# Patient Record
Sex: MALE | Race: WHITE | NOT HISPANIC OR LATINO
[De-identification: names, ages, dates, MRNs, and addresses within clinical notes are randomized per-mention and may not be internally consistent; named-entity substitution may affect disease eponyms.]

---

## 2018-01-09 ENCOUNTER — APPOINTMENT (OUTPATIENT)
Dept: UROLOGY | Facility: CLINIC | Age: 48
End: 2018-01-09
Payer: COMMERCIAL

## 2018-01-09 DIAGNOSIS — Z98.890 OTHER SPECIFIED POSTPROCEDURAL STATES: ICD-10-CM

## 2018-01-09 DIAGNOSIS — Z90.81 OTHER SPECIFIED POSTPROCEDURAL STATES: ICD-10-CM

## 2018-01-09 DIAGNOSIS — Z80.6 FAMILY HISTORY OF LEUKEMIA: ICD-10-CM

## 2018-01-09 DIAGNOSIS — R39.15 URGENCY OF URINATION: ICD-10-CM

## 2018-01-09 DIAGNOSIS — Z00.00 ENCOUNTER FOR GENERAL ADULT MEDICAL EXAMINATION W/OUT ABNORMAL FINDINGS: ICD-10-CM

## 2018-01-09 DIAGNOSIS — Z78.9 OTHER SPECIFIED HEALTH STATUS: ICD-10-CM

## 2018-01-09 PROCEDURE — 51798 US URINE CAPACITY MEASURE: CPT

## 2018-01-09 PROCEDURE — 99244 OFF/OP CNSLTJ NEW/EST MOD 40: CPT

## 2018-01-09 RX ORDER — ALFUZOSIN HYDROCHLORIDE 10 MG/1
10 TABLET, EXTENDED RELEASE ORAL
Qty: 90 | Refills: 3 | Status: ACTIVE | COMMUNITY
Start: 2018-01-09 | End: 1900-01-01

## 2018-01-10 LAB
APPEARANCE: CLEAR
BACTERIA: NEGATIVE
BILIRUBIN URINE: NEGATIVE
BLOOD URINE: NEGATIVE
COLOR: YELLOW
GLUCOSE QUALITATIVE U: NEGATIVE MG/DL
KETONES URINE: NEGATIVE
LEUKOCYTE ESTERASE URINE: NEGATIVE
MICROSCOPIC-UA: NORMAL
NITRITE URINE: NEGATIVE
PH URINE: 6.5
PROTEIN URINE: NEGATIVE MG/DL
RED BLOOD CELLS URINE: 2 /HPF
SPECIFIC GRAVITY URINE: 1.02
SQUAMOUS EPITHELIAL CELLS: 0 /HPF
UROBILINOGEN URINE: 1 MG/DL
WHITE BLOOD CELLS URINE: 0 /HPF

## 2018-01-11 LAB
BACTERIA UR CULT: NORMAL
CORE LAB FLUID CYTOLOGY: NORMAL

## 2018-07-10 ENCOUNTER — APPOINTMENT (OUTPATIENT)
Dept: UROLOGY | Facility: CLINIC | Age: 48
End: 2018-07-10
Payer: COMMERCIAL

## 2018-07-10 PROCEDURE — 99214 OFFICE O/P EST MOD 30 MIN: CPT

## 2018-07-11 LAB
APPEARANCE: CLEAR
BACTERIA: NEGATIVE
BILIRUBIN URINE: NEGATIVE
BLOOD URINE: NEGATIVE
COLOR: YELLOW
GLUCOSE QUALITATIVE U: NEGATIVE MG/DL
HYALINE CASTS: 0 /LPF
KETONES URINE: ABNORMAL
LEUKOCYTE ESTERASE URINE: NEGATIVE
MICROSCOPIC-UA: NORMAL
NITRITE URINE: NEGATIVE
PH URINE: 5.5
PROTEIN URINE: NEGATIVE MG/DL
PSA FREE FLD-MCNC: 11.2
PSA FREE SERPL-MCNC: 0.31 NG/ML
PSA SERPL-MCNC: 2.78 NG/ML
RED BLOOD CELLS URINE: 2 /HPF
SPECIFIC GRAVITY URINE: 1.03
SQUAMOUS EPITHELIAL CELLS: 0 /HPF
UROBILINOGEN URINE: NEGATIVE MG/DL
WHITE BLOOD CELLS URINE: 0 /HPF

## 2019-07-09 ENCOUNTER — APPOINTMENT (OUTPATIENT)
Dept: UROLOGY | Facility: CLINIC | Age: 49
End: 2019-07-09
Payer: COMMERCIAL

## 2019-07-09 PROCEDURE — 99214 OFFICE O/P EST MOD 30 MIN: CPT

## 2019-07-09 NOTE — PHYSICAL EXAM
[General Appearance - Well Developed] : well developed [General Appearance - Well Nourished] : well nourished [Normal Appearance] : normal appearance [Well Groomed] : well groomed [General Appearance - In No Acute Distress] : no acute distress [Abdomen Soft] : soft [Abdomen Tenderness] : non-tender [Urethral Meatus] : meatus normal [Costovertebral Angle Tenderness] : no ~M costovertebral angle tenderness [Urinary Bladder Findings] : the bladder was normal on palpation [Scrotum] : the scrotum was normal [No Prostate Nodules] : no prostate nodules [Testes Mass (___cm)] : there were no testicular masses [Edema] : no peripheral edema [Respiration, Rhythm And Depth] : normal respiratory rhythm and effort [] : no respiratory distress [Exaggerated Use Of Accessory Muscles For Inspiration] : no accessory muscle use [Oriented To Time, Place, And Person] : oriented to person, place, and time [Affect] : the affect was normal [Mood] : the mood was normal [Not Anxious] : not anxious [Normal Station and Gait] : the gait and station were normal for the patient's age [No Focal Deficits] : no focal deficits [No Palpable Adenopathy] : no palpable adenopathy

## 2019-07-10 LAB
APPEARANCE: ABNORMAL
BACTERIA: NEGATIVE
BILIRUBIN URINE: NEGATIVE
BLOOD URINE: NORMAL
CALCIUM OXALATE CRYSTALS: ABNORMAL
COLOR: YELLOW
GLUCOSE QUALITATIVE U: NEGATIVE
HYALINE CASTS: 1 /LPF
KETONES URINE: ABNORMAL
LEUKOCYTE ESTERASE URINE: NEGATIVE
MICROSCOPIC-UA: NORMAL
NITRITE URINE: NEGATIVE
PH URINE: 6
PROTEIN URINE: ABNORMAL
PSA FREE FLD-MCNC: 8 %
PSA FREE SERPL-MCNC: 0.41 NG/ML
PSA SERPL-MCNC: 5.37 NG/ML
RED BLOOD CELLS URINE: 13 /HPF
SPECIFIC GRAVITY URINE: 1.03
SQUAMOUS EPITHELIAL CELLS: 1 /HPF
UROBILINOGEN URINE: NORMAL
WHITE BLOOD CELLS URINE: 2 /HPF

## 2019-07-30 ENCOUNTER — OFFICE VISIT (OUTPATIENT)
Dept: URGENT CARE | Facility: URGENT CARE | Age: 49
End: 2019-07-30
Payer: COMMERCIAL

## 2019-07-30 VITALS
SYSTOLIC BLOOD PRESSURE: 171 MMHG | DIASTOLIC BLOOD PRESSURE: 99 MMHG | WEIGHT: 284 LBS | RESPIRATION RATE: 20 BRPM | OXYGEN SATURATION: 95 % | HEIGHT: 74 IN | HEART RATE: 75 BPM | BODY MASS INDEX: 36.45 KG/M2 | TEMPERATURE: 98.2 F

## 2019-07-30 DIAGNOSIS — Z48.02 VISIT FOR SUTURE REMOVAL: Primary | ICD-10-CM

## 2019-07-30 PROCEDURE — 99201 * NO LONGER ACTIVE 2021* PR OFFICE OUTPATIENT NEW 10 MINUTES: CPT | Performed by: PHYSICIAN ASSISTANT

## 2019-07-30 ASSESSMENT — PAIN SCALES - GENERAL: PAINLEVEL: 0-NO PAIN

## 2019-07-30 NOTE — PROGRESS NOTES
Clinic Note     SUBJECTIVE    Chief Complaint:  Chief Complaint   Patient presents with   • Suture / Staple Removal     Patient presents for suture removal from his right leg following a skin biopsy, sutures placed 12 days ago       HPI:    Raghavendra Meng is a 49 y.o. male who presents for Suture / Staple Removal (Patient presents for suture removal from his right leg following a skin biopsy, sutures placed 12 days ago)  . No c/o's.    The following have been reviewed and updated as appropriate in this visit:         No Known Allergies  No current outpatient medications on file.     No current facility-administered medications for this visit.      History reviewed. No pertinent past medical history.  History reviewed. No pertinent surgical history.  Family History   Problem Relation Age of Onset   • No Known Problems Mother      Social History     Socioeconomic History   • Marital status:      Spouse name: Not on file   • Number of children: Not on file   • Years of education: Not on file   • Highest education level: Not on file   Occupational History   • Not on file   Social Needs   • Financial resource strain: Not on file   • Food insecurity:     Worry: Not on file     Inability: Not on file   • Transportation needs:     Medical: Not on file     Non-medical: Not on file   Tobacco Use   • Smoking status: Never Smoker   • Smokeless tobacco: Never Used   Substance and Sexual Activity   • Alcohol use: Not on file   • Drug use: Not on file   • Sexual activity: Not on file   Lifestyle   • Physical activity:     Days per week: Not on file     Minutes per session: Not on file   • Stress: Not on file   Relationships   • Social connections:     Talks on phone: Not on file     Gets together: Not on file     Attends Yazidi service: Not on file     Active member of club or organization: Not on file     Attends meetings of clubs or organizations: Not on file     Relationship status: Not on file   Other Topics Concern  "  • Not on file   Social History Narrative   • Not on file       Review of Systems:  Review of Systems  As per HPI    OBJECTIVE     Vitals:  /99 (BP Location: Right arm, Patient Position: Sitting, Cuff Size: Reg)   Pulse 75   Temp 36.8 °C (98.2 °F)   Resp 20   Ht 1.88 m (6' 2\")   Wt 129 kg (284 lb)   SpO2 95%   BMI 36.46 kg/m²     PE:  Physical Exam   Constitutional: He appears well-developed and well-nourished.   HENT:   Head: Normocephalic and atraumatic.   Eyes: Pupils are equal, round, and reactive to light. EOM are normal.   Neck: Neck supple.   Cardiovascular: Normal rate and regular rhythm.   Pulmonary/Chest: Effort normal.   Lymphadenopathy:     He has no cervical adenopathy.   Skin: Skin is warm and dry.              No results found for this or any previous visit (from the past 12 hour(s)).      ASSESSMENT:    Raghavendra was seen today for suture / staple removal.    Diagnoses and all orders for this visit:    Visit for suture removal          PLAN  Suture removal accomplished as above.  Suture line well-healed.  No immediate problems.  Band-Aid applied.  Patient stated he was going to go to the hotel and take a shower and he would apply bacitracin and a Band-Aid afterward.  Instructed he may leave it open tomorrow and thereafter.  Any problems or concerns he can recheck anytime.    Patient verbalizes understanding of above information and will contact [facility] with further questions or concerns.    DONOVAN CHOUDHURY  "

## 2019-09-19 ENCOUNTER — OUTPATIENT (OUTPATIENT)
Dept: OUTPATIENT SERVICES | Facility: HOSPITAL | Age: 49
LOS: 1 days | End: 2019-09-19
Payer: COMMERCIAL

## 2019-09-19 ENCOUNTER — APPOINTMENT (OUTPATIENT)
Dept: MRI IMAGING | Facility: HOSPITAL | Age: 49
End: 2019-09-19

## 2019-09-19 PROCEDURE — 70551 MRI BRAIN STEM W/O DYE: CPT

## 2019-09-19 PROCEDURE — 70551 MRI BRAIN STEM W/O DYE: CPT | Mod: 26

## 2019-09-19 PROCEDURE — 72141 MRI NECK SPINE W/O DYE: CPT

## 2019-09-19 PROCEDURE — 72141 MRI NECK SPINE W/O DYE: CPT | Mod: 26

## 2021-06-30 ENCOUNTER — APPOINTMENT (OUTPATIENT)
Dept: UROLOGY | Facility: CLINIC | Age: 51
End: 2021-06-30
Payer: COMMERCIAL

## 2021-06-30 DIAGNOSIS — N13.8 BENIGN PROSTATIC HYPERPLASIA WITH LOWER URINARY TRACT SYMPMS: ICD-10-CM

## 2021-06-30 DIAGNOSIS — R97.20 ELEVATED PROSTATE, SPECIFIC ANTIGEN [PSA]: ICD-10-CM

## 2021-06-30 DIAGNOSIS — R35.0 FREQUENCY OF MICTURITION: ICD-10-CM

## 2021-06-30 DIAGNOSIS — N40.1 BENIGN PROSTATIC HYPERPLASIA WITH LOWER URINARY TRACT SYMPMS: ICD-10-CM

## 2021-06-30 PROCEDURE — 99072 ADDL SUPL MATRL&STAF TM PHE: CPT

## 2021-06-30 PROCEDURE — 99214 OFFICE O/P EST MOD 30 MIN: CPT

## 2021-07-01 LAB
APPEARANCE: ABNORMAL
BACTERIA: NEGATIVE
BILIRUBIN URINE: NEGATIVE
BLOOD URINE: NEGATIVE
COLOR: NORMAL
GLUCOSE QUALITATIVE U: NEGATIVE
HYALINE CASTS: 0 /LPF
KETONES URINE: NORMAL
LEUKOCYTE ESTERASE URINE: NEGATIVE
MICROSCOPIC-UA: NORMAL
NITRITE URINE: NEGATIVE
PH URINE: 6
PROTEIN URINE: NORMAL
PSA FREE FLD-MCNC: 8 %
PSA FREE SERPL-MCNC: 0.75 NG/ML
PSA SERPL-MCNC: 9.22 NG/ML
RED BLOOD CELLS URINE: 1 /HPF
SPECIFIC GRAVITY URINE: 1.02
SQUAMOUS EPITHELIAL CELLS: 0 /HPF
UROBILINOGEN URINE: NORMAL
WHITE BLOOD CELLS URINE: 1 /HPF

## 2021-08-01 ENCOUNTER — APPOINTMENT (OUTPATIENT)
Dept: CT IMAGING | Facility: HOSPITAL | Age: 51
End: 2021-08-01
Payer: COMMERCIAL

## 2021-08-01 ENCOUNTER — HOSPITAL ENCOUNTER (EMERGENCY)
Facility: HOSPITAL | Age: 51
Discharge: HOME | End: 2021-08-01
Attending: EMERGENCY MEDICINE
Payer: COMMERCIAL

## 2021-08-01 VITALS
BODY MASS INDEX: 40.06 KG/M2 | HEIGHT: 74 IN | DIASTOLIC BLOOD PRESSURE: 85 MMHG | TEMPERATURE: 97.9 F | SYSTOLIC BLOOD PRESSURE: 148 MMHG | RESPIRATION RATE: 16 BRPM | WEIGHT: 312.17 LBS | OXYGEN SATURATION: 94 % | HEART RATE: 65 BPM

## 2021-08-01 DIAGNOSIS — R42 DIZZINESS: Primary | ICD-10-CM

## 2021-08-01 LAB
ANION GAP SERPL CALC-SCNC: 14 MMOL/L (ref 3–11)
BASOPHILS # BLD AUTO: 0.1 10*3/UL
BASOPHILS NFR BLD AUTO: 1 % (ref 0–2)
BUN SERPL-MCNC: 11 MG/DL (ref 7–25)
CALCIUM SERPL-MCNC: 9.4 MG/DL (ref 8.6–10.3)
CHLORIDE SERPL-SCNC: 100 MMOL/L (ref 98–107)
CO2 SERPL-SCNC: 24 MMOL/L (ref 21–32)
CREAT SERPL-MCNC: 0.67 MG/DL (ref 0.7–1.3)
EOSINOPHIL # BLD AUTO: 0 10*3/UL
EOSINOPHIL NFR BLD AUTO: 0 % (ref 0–3)
ERYTHROCYTE [DISTWIDTH] IN BLOOD BY AUTOMATED COUNT: 13.7 % (ref 11.5–15)
GFR SERPL CREATININE-BSD FRML MDRD: 110 ML/MIN/1.73M*2
GLUCOSE SERPL-MCNC: 81 MG/DL (ref 70–105)
HCT VFR BLD AUTO: 43.9 % (ref 38–50)
HGB BLD-MCNC: 14.9 G/DL (ref 13.2–17.2)
LYMPHOCYTES # BLD AUTO: 1.6 10*3/UL
LYMPHOCYTES NFR BLD AUTO: 16 % (ref 15–47)
MCH RBC QN AUTO: 32.9 PG (ref 29–34)
MCHC RBC AUTO-ENTMCNC: 33.8 G/DL (ref 32–36)
MCV RBC AUTO: 97.2 FL (ref 82–97)
MONOCYTES # BLD AUTO: 0.9 10*3/UL
MONOCYTES NFR BLD AUTO: 9 % (ref 5–13)
NEUTROPHILS # BLD AUTO: 7.6 10*3/UL
NEUTROPHILS NFR BLD AUTO: 74 % (ref 46–70)
PLATELET # BLD AUTO: 281 10*3/UL (ref 130–350)
PMV BLD AUTO: 8.4 FL (ref 6.9–10.8)
POTASSIUM SERPL-SCNC: 3.7 MMOL/L (ref 3.5–5.1)
RBC # BLD AUTO: 4.52 10*6/ΜL (ref 4.1–5.8)
SODIUM SERPL-SCNC: 138 MMOL/L (ref 135–145)
TROPONIN I SERPL-MCNC: 20.2 PG/ML
TROPONIN I SERPL-MCNC: 20.3 PG/ML
WBC # BLD AUTO: 10.3 10*3/UL (ref 3.7–9.6)

## 2021-08-01 PROCEDURE — 96375 TX/PRO/DX INJ NEW DRUG ADDON: CPT

## 2021-08-01 PROCEDURE — 6360000200 HC RX 636 W HCPCS (ALT 250 FOR IP): Performed by: EMERGENCY MEDICINE

## 2021-08-01 PROCEDURE — 84484 ASSAY OF TROPONIN QUANT: CPT | Performed by: EMERGENCY MEDICINE

## 2021-08-01 PROCEDURE — 85025 COMPLETE CBC W/AUTO DIFF WBC: CPT | Performed by: EMERGENCY MEDICINE

## 2021-08-01 PROCEDURE — 2580000300 HC RX 258: Performed by: EMERGENCY MEDICINE

## 2021-08-01 PROCEDURE — 96374 THER/PROPH/DIAG INJ IV PUSH: CPT

## 2021-08-01 PROCEDURE — 80048 BASIC METABOLIC PNL TOTAL CA: CPT | Performed by: EMERGENCY MEDICINE

## 2021-08-01 PROCEDURE — 96361 HYDRATE IV INFUSION ADD-ON: CPT

## 2021-08-01 PROCEDURE — 36415 COLL VENOUS BLD VENIPUNCTURE: CPT | Performed by: EMERGENCY MEDICINE

## 2021-08-01 PROCEDURE — 99284 EMERGENCY DEPT VISIT MOD MDM: CPT | Performed by: EMERGENCY MEDICINE

## 2021-08-01 PROCEDURE — G1004 CDSM NDSC: HCPCS

## 2021-08-01 PROCEDURE — 93005 ELECTROCARDIOGRAM TRACING: CPT | Performed by: EMERGENCY MEDICINE

## 2021-08-01 RX ORDER — HYDRALAZINE HYDROCHLORIDE 20 MG/ML
10 INJECTION INTRAMUSCULAR; INTRAVENOUS ONCE
Status: COMPLETED | OUTPATIENT
Start: 2021-08-01 | End: 2021-08-01

## 2021-08-01 RX ORDER — SODIUM CHLORIDE 9 MG/ML
100 INJECTION, SOLUTION INTRAVENOUS CONTINUOUS
Status: DISCONTINUED | OUTPATIENT
Start: 2021-08-01 | End: 2021-08-01 | Stop reason: HOSPADM

## 2021-08-01 RX ORDER — SODIUM CHLORIDE 9 MG/ML
1000 INJECTION, SOLUTION INTRAVENOUS ONCE
Status: COMPLETED | OUTPATIENT
Start: 2021-08-01 | End: 2021-08-01

## 2021-08-01 RX ORDER — METOPROLOL SUCCINATE 50 MG/1
50 TABLET, EXTENDED RELEASE ORAL DAILY
COMMUNITY

## 2021-08-01 RX ORDER — LORAZEPAM 2 MG/ML
1 INJECTION INTRAMUSCULAR ONCE
Status: COMPLETED | OUTPATIENT
Start: 2021-08-01 | End: 2021-08-01

## 2021-08-01 RX ADMIN — HYDRALAZINE HYDROCHLORIDE 10 MG: 20 INJECTION INTRAMUSCULAR; INTRAVENOUS at 15:44

## 2021-08-01 RX ADMIN — LORAZEPAM 1 MG: 2 INJECTION INTRAMUSCULAR; INTRAVENOUS at 19:15

## 2021-08-01 RX ADMIN — SODIUM CHLORIDE 1000 ML: 9 INJECTION, SOLUTION INTRAVENOUS at 15:46

## 2021-08-01 RX ADMIN — SODIUM CHLORIDE 100 ML/HR: 9 INJECTION, SOLUTION INTRAVENOUS at 15:25

## 2021-08-01 NOTE — ED PROVIDER NOTES
Room: 14    HPI:  Chief Complaint   Patient presents with   • Hypertension     Pt arrives with c/o lightheadedness x 2 hours and concern for hypertension.  Pt recent dx with HTN and placed on Metoprolol.  BP at home PTA was 196/110.  DANICA 0.   • Dizziness     HPI  Patient presents for evaluation of concerns of his blood pressure.  He states he woke up this morning and was not feeling well.  He has a hard time articulating this but he states he was somewhat jittery and felt lightheaded.  His blood pressure was good this morning with systolic of 130 but he states he did not feel with it today and recheck thisand it went up to 170 systolic and then 190.  He states he was recently started on metoprolol for blood pressure 3 weeks ago.  He is here riding motorcycles from out of town.  He did have some alcohol last night but denies any today.  He has not had fever or cough or vomiting and no symptoms of illness.  He denies headache or chest pain or palpitations or shortness of breath.    HISTORY:  Past Medical History:   Diagnosis Date   • Hypertension        History reviewed. No pertinent surgical history.    Family History   Problem Relation Age of Onset   • No Known Problems Mother        Social History     Tobacco Use   • Smoking status: Never Smoker   • Smokeless tobacco: Never Used   Substance Use Topics   • Alcohol use: Yes     Comment: Occasionally   • Drug use: Yes     Types: Marijuana       ROS:  Constitutional: Negative for fever.   HENT: Negative for sore throat.    Eyes: Negative for pain.   Respiratory: Negative for shortness of breath.    Cardiovascular: Negative for chest pain.   Gastrointestinal: Negative for abdominal pain.   Endocrine: Negative for polyuria.   Genitourinary: Negative for flank pain.   Musculoskeletal: Negative for back pain.   Neurological: Negative for headaches.  Lightheaded  Hematological: Negative for bleeding.    PHYSICAL EXAM:  ED Triage Vitals   Temp Heart Rate Resp BP SpO2    08/01/21 1438 08/01/21 1438 08/01/21 1438 08/01/21 1438 08/01/21 1438   97.9 °F (36.6 °C) 62 18 (!) 195/105 95 %      Temp Source Heart Rate Source Patient Position BP Location FiO2 (%)   08/01/21 1438 -- 08/01/21 1900 -- --   Oral  Head of bed 30 degrees or higher       Nursing note and vitals reviewed.  Constitutional: appears well-developed.   HENT: left in mask, no gross abnormalities  Head: Normocephalic and atraumatic.   Eyes: Pupils are equal, round, .   Neck: Supple, no lymphadenopathy  Cardiovascular: Regular rate and rhythm with no murmur, rub, or gallop.  Normal pulses.  Pulmonary/Chest: No respiratory distress.  Clear to auscultation bilaterally.  Abdominal: Soft and nontender.    Back: No CVA tenderness.  Musculoskeletal: No edema  Neurological: Alert.  No weakness or focal deficits  Skin: Skin is warm and dry. No rash noted.   Psychiatric: Normal mood and affect.      Labs Reviewed   CBC WITH AUTO DIFFERENTIAL - Abnormal       Result Value    WBC 10.3 (*)     RBC 4.52      Hemoglobin 14.9      Hematocrit 43.9      MCV 97.2 (*)     MCH 32.9      MCHC 33.8      RDW 13.7      Platelets 281      MPV 8.4      Neutrophils% 74 (*)     Lymphocytes% 16      Monocytes% 9      Eosinophils% 0      Basophils% 1      ANC (auto diff) 7.60      Lymphocytes Absolute 1.60      Monocytes Absolute 0.90      Eosinophils Absolute 0.00      Basophils Absolute 0.10     BASIC METABOLIC PANEL - Abnormal    Sodium 138      Potassium 3.7      Chloride 100      CO2 24      BUN 11      Creatinine 0.67 (*)     Glucose 81      Calcium 9.4      Anion Gap 14 (*)     eGFR 110      Narrative:     Estimated GFR calculated using the 2009 CKD-EPI creatinine equation.   HIGH SENSITIVE TROPONIN I - Abnormal    hsTnI 0 Hour 20.2 (*)    HIGH SENSITIVE TROPONIN I - Abnormal    hsTnI 0 Hour 20.3 (*)    URINE EXTRA CONTAINER       CT head without IV contrast   Final Result   IMPRESSION:   No acute intracranial findings.          ED Medication  Administration from 08/01/2021 1434 to 08/01/2021 1926       Date/Time Order Dose Route Action Action by     08/01/2021 1546 sodium chloride 0.9 % bolus 1,000 mL 1,000 mL intravenous New Bag/New Syringe HerculesFRANCESCA     08/01/2021 1716 sodium chloride 0.9 % bolus 1,000 mL 0 mL intravenous Stopped FRANCESCA Maloney     08/01/2021 1544 hydrALAZINE (APRESOLINE) injection 10 mg 10 mg intravenous Given FRANCESCA Hercules     08/01/2021 1525 sodium chloride 0.9 % infusion 100 mL/hr intravenous New Bag/New Syringe DIANA Duggan     08/01/2021 1926 sodium chloride 0.9 % infusion 0 mL/hr intravenous Stopped DIANA Duggan     08/01/2021 1915 LORazepam (ATIVAN) injection 1 mg 1 mg intravenous Given DIANA Duggan            PROCEDURES:  Procedures      ED COURSE:  ED Course as of Aug 20 1217   Sun Aug 01, 2021   1547 EKG: Sinus rhythm at 61 with no ischemia or ectopy.    [BE]      ED Course User Index  [BE] Hawa Sapp MD       MDM:     Patient presents for evaluation of feared complications of his hypertension.  He is here visiting and states today did not feel well in general.  He states he took his blood pressure initial blood pressure was good but on serial checks it has been going up.  He denies focal weakness or deficits.  Is not had chest pain or palpitations.  With this he had comprehensive evaluation.  EKG does not show ischemic changes.  CT of his head does not show intracranial hemorrhage or stroke or abnormality.  His initial troponin is mildly elevated and this was rechecked and is stable and I do not believe this represents acute cardiac event.  He has no anemia or renal failure.  He was given some hydralazine but was also given some Ativan as he seemed very anxious about this blood pressure.  I am unsure what role the alcohol he had last night is having in his symptoms if this is related to that more than his blood pressure or if this is contributed to his hypertension.  At this time blood pressure is improved.  He was encouraged to  continue taking his medication as directed and follow-up with his doctor upon return home.      CLINICAL IMPRESSION:  Final diagnoses:   [R42] Dizziness   Hypertension      A voice recognition program was used to aid in documentation of this record.  Sometimes words are not printed exactly as they were spoken.  While efforts were made to carefully edit and correct any inaccuracies, some areas may be present; please take these into context.  Please contact the provider if areas are identified.               Hawa Sapp MD  08/20/21 7399

## 2021-08-01 NOTE — MEDICATION HISTORY SPECIALIST NOTES
[facility] ED-214    Patients medication history was entered into Epic by nurse. Pharmacy Medication History Specialist reviewed nurse's update and verified with resource(s). No discrepancies were noted. Medication History Specialist did not interview this patient; however, will follow up if requested.      Information sources:  [facility]  Pharmacy-[facility] via phone, verified metoprolol

## 2021-11-23 ENCOUNTER — APPOINTMENT (OUTPATIENT)
Dept: MRI IMAGING | Facility: CLINIC | Age: 51
End: 2021-11-23
Payer: COMMERCIAL

## 2021-11-23 PROCEDURE — 72197 MRI PELVIS W/O & W/DYE: CPT

## 2021-11-23 PROCEDURE — A9585: CPT | Mod: NC,JW

## 2021-12-03 ENCOUNTER — NON-APPOINTMENT (OUTPATIENT)
Age: 51
End: 2021-12-03

## 2021-12-22 ENCOUNTER — NON-APPOINTMENT (OUTPATIENT)
Age: 51
End: 2021-12-22

## 2021-12-28 ENCOUNTER — APPOINTMENT (OUTPATIENT)
Dept: UROLOGY | Facility: CLINIC | Age: 51
End: 2021-12-28

## 2023-03-07 ENCOUNTER — NON-APPOINTMENT (OUTPATIENT)
Age: 53
End: 2023-03-07

## 2023-03-29 ENCOUNTER — APPOINTMENT (OUTPATIENT)
Dept: UROLOGY | Facility: CLINIC | Age: 53
End: 2023-03-29
Payer: COMMERCIAL

## 2023-03-29 VITALS
SYSTOLIC BLOOD PRESSURE: 150 MMHG | HEART RATE: 86 BPM | HEIGHT: 73 IN | DIASTOLIC BLOOD PRESSURE: 85 MMHG | RESPIRATION RATE: 17 BRPM | BODY MASS INDEX: 38.43 KG/M2 | WEIGHT: 290 LBS

## 2023-03-29 DIAGNOSIS — C61 MALIGNANT NEOPLASM OF PROSTATE: ICD-10-CM

## 2023-03-29 PROCEDURE — 99214 OFFICE O/P EST MOD 30 MIN: CPT

## 2023-03-30 LAB — TESTOST SERPL-MCNC: 412 NG/DL

## 2023-03-30 RX ORDER — SILDENAFIL 100 MG/1
100 TABLET, FILM COATED ORAL
Qty: 15 | Refills: 5 | Status: ACTIVE | COMMUNITY
Start: 2023-03-29 | End: 1900-01-01

## 2023-03-31 PROBLEM — C61 CARCINOMA OF PROSTATE: Status: ACTIVE | Noted: 2023-03-31

## 2023-03-31 NOTE — ASSESSMENT
[FreeTextEntry1] : discussed options for ED - medical therapy, ICI and implant surgery\par will try adding on demand Sildenafil to daily cialis and see Mike about ICIC

## 2023-03-31 NOTE — HISTORY OF PRESENT ILLNESS
[FreeTextEntry1] : Here for evaluation for ED \par [place] [facility] 1/[place] +LNs. on observation\par No leakage no pads,\par has ED - gets 5-60% erect and losses quickly. On daily cialis; no real difference with 4.\par a slight improvement over past few months. Libido OK \par No issues preop.

## 2023-04-14 DIAGNOSIS — N52.9 MALE ERECTILE DYSFUNCTION, UNSPECIFIED: ICD-10-CM

## 2023-04-17 ENCOUNTER — APPOINTMENT (OUTPATIENT)
Dept: UROLOGY | Facility: CLINIC | Age: 53
End: 2023-04-17
Payer: COMMERCIAL

## 2023-04-17 PROCEDURE — 99215 OFFICE O/P EST HI 40 MIN: CPT | Mod: 95

## 2023-04-17 RX ORDER — PAPAVERINE HYDROCHLORIDE 30 MG/ML
30 INJECTION, SOLUTION INTRAVENOUS
Qty: 2 | Refills: 0 | Status: ACTIVE | COMMUNITY
Start: 2023-04-17 | End: 1900-01-01

## 2023-04-17 NOTE — HISTORY OF PRESENT ILLNESS
[FreeTextEntry1] : The patient-doctor. relationship has been established in a face-to-face fashion on real-time video audio HIPAA compliant communication using telemedicine software. The patient was at home and the physician was in his office. The patient's identity has been confirmed.  The patient was previously emailed a copy of the telemedicine consent.  The patient has had a chance to review and has now given verbal consent and has requested care to be assessed and treated through telemedicine. The patient understands there may be limitations in this process and that they need not need further follow-up care in the office and/or hospital settings. We were unable to use the [employer] platform and an alternative platform was utilized.  The patient was at home and I was in the office.\par \par Verbal consent was given on Monday, April 17, 2023 at 9 AM by the patient.  It was requested by the physician.  A written consent was previously sent for the patient to sign and return.\par \par Patient is a 53-year-old gentleman who presents with a chief complaint of erectile dysfunction status post RALP (March 16, 2023 at [facility] [place]).. He states that this has been present for the past 2 years. It causes both he and his partner great stress.  I reviewed the questionnaire he completed in detail. His erections are not modified with the degree of sexual stimulation.   He states that his erections presently are often less than 7 out of 10 in both tumescence and rigidity.  He states that he is difficulty maintaining the erection and sometimes the erection does not occur at all..  He has difficulty maintaining an erection. He describes a normal libido.  His sexual dysfunction occurs with both sexual relations and masturbation.  His erections are not improved with PDE5 inhibitors.    His partner is understanding and was unable to be with him at the visit today. He is not  and has adult children.  \par \par The patient denies fevers, chills, nausea and/or vomiting and no unexplained weight loss.  His past medical history is non-contributory.  In his present occupation he has no known toxin exposure. He does not smoke and drinks socially.  He has no known drug allergies. His review of systems and past medical history demonstrates no significant urologic issues (see patient completed questionnaire). His family history demonstrates no significant urologic issues.

## 2023-04-17 NOTE — ASSESSMENT
[FreeTextEntry1] : This pleasant gentleman presents for evaluation of his sexual dysfunction.  I have requested several blood studies and a urine analysis.  I will make further recommendations when the results return. I have suggested a diagnostic injection and duplex ultrasound to evaluate his penile vasculature. We discussed his evaluation today and possible options for therapy depending upon the results of his testing.  I will be better able to make more specific recommendations after additional test results return. \par 1. Review blood tests on follow up\par 2. Penile duplex study on follow up\par \par Telehealth Consultation: 40 minutes  20 minutes reviewing his history and discussing prior results.  20 minutes discussing various treatment options, writing medication prescriptions, requests for lab testing and writing his note. There was also additional time in preparing for the visit and assisting the patient with technology issues he was having with the telehealth platform.\par

## 2023-04-27 LAB
25(OH)D3 SERPL-MCNC: 16 NG/ML
ALBUMIN SERPL ELPH-MCNC: 4.9 G/DL
ALP BLD-CCNC: 81 U/L
ALT SERPL-CCNC: 29 U/L
ANION GAP SERPL CALC-SCNC: 15 MMOL/L
APO A-I SERPL-MCNC: 235 MG/DL
APO A-I SERPL-MCNC: 235 MG/DL
APO A-I/APO B SERPL: 0.48 RATIO
APO B SERPL-MCNC: 113 MG/DL
APO B SERPL-MCNC: 113 MG/DL
APO LP(A) SERPL-MCNC: 201.4 NMOL/L
APPEARANCE: CLEAR
AST SERPL-CCNC: 33 U/L
BILIRUB SERPL-MCNC: 0.6 MG/DL
BILIRUBIN URINE: NEGATIVE
BLOOD URINE: NEGATIVE
BUN SERPL-MCNC: 10 MG/DL
CALCIUM SERPL-MCNC: 10.1 MG/DL
CHLORIDE SERPL-SCNC: 99 MMOL/L
CHOLEST SERPL-MCNC: 249 MG/DL
CO2 SERPL-SCNC: 27 MMOL/L
COLOR: NORMAL
CREAT SERPL-MCNC: 0.69 MG/DL
CRP SERPL HS-MCNC: 0.99 MG/L
EGFR: 110 ML/MIN/1.73M2
ESTIMATED AVERAGE GLUCOSE: 103 MG/DL
ESTRADIOL SERPL-MCNC: 33 PG/ML
GLUCOSE QUALITATIVE U: NEGATIVE
GLUCOSE SERPL-MCNC: 101 MG/DL
HBA1C MFR BLD HPLC: 5.2 %
HCT VFR BLD CALC: 46.5 %
HDLC SERPL-MCNC: 118 MG/DL
HGB BLD-MCNC: 15.2 G/DL
KETONES URINE: ABNORMAL
LDLC SERPL CALC-MCNC: 123 MG/DL
LEUKOCYTE ESTERASE URINE: NEGATIVE
LH SERPL-ACNC: 3.1 IU/L
MCHC RBC-ENTMCNC: 32.5 PG
MCHC RBC-ENTMCNC: 32.7 GM/DL
MCV RBC AUTO: 99.4 FL
NITRITE URINE: NEGATIVE
NONHDLC SERPL-MCNC: 132 MG/DL
PH URINE: 6
PLATELET # BLD AUTO: 364 K/UL
POTASSIUM SERPL-SCNC: 4.9 MMOL/L
PROLACTIN SERPL-MCNC: 11.5 NG/ML
PROT SERPL-MCNC: 7.5 G/DL
PROTEIN URINE: NORMAL
PSA SERPL-MCNC: 0.05 NG/ML
RBC # BLD: 4.68 M/UL
RBC # FLD: 14.2 %
SODIUM SERPL-SCNC: 140 MMOL/L
SPECIFIC GRAVITY URINE: 1.02
TESTOST FREE SERPL-MCNC: 9.6 PG/ML
TESTOST SERPL-MCNC: 470 NG/DL
TRIGL SERPL-MCNC: 45 MG/DL
TSH SERPL-ACNC: 1.18 UIU/ML
UROBILINOGEN URINE: NORMAL
WBC # FLD AUTO: 6.4 K/UL

## 2023-08-08 PROBLEM — N52.01 ERECTILE DYSFUNCTION DUE TO ARTERIAL INSUFFICIENCY: Status: ACTIVE | Noted: 2023-03-29

## 2023-08-10 ENCOUNTER — OUTPATIENT (OUTPATIENT)
Dept: OUTPATIENT SERVICES | Facility: HOSPITAL | Age: 53
LOS: 1 days | End: 2023-08-10
Payer: COMMERCIAL

## 2023-08-10 ENCOUNTER — APPOINTMENT (OUTPATIENT)
Dept: UROLOGY | Facility: CLINIC | Age: 53
End: 2023-08-10
Payer: COMMERCIAL

## 2023-08-10 DIAGNOSIS — N52.01 ERECTILE DYSFUNCTION DUE TO ARTERIAL INSUFFICIENCY: ICD-10-CM

## 2023-08-10 PROCEDURE — 54235 NJX CORPORA CAVERNOSA RX AGT: CPT

## 2023-08-10 PROCEDURE — 93980 PENILE VASCULAR STUDY: CPT | Mod: 26

## 2023-08-10 PROCEDURE — 93980 PENILE VASCULAR STUDY: CPT

## 2023-08-10 PROCEDURE — 99214 OFFICE O/P EST MOD 30 MIN: CPT | Mod: 25

## 2023-08-10 NOTE — HISTORY OF PRESENT ILLNESS
[FreeTextEntry1] : The patient returns for follow up to review his blood studies and discuss options for therapy.  PMH: Patient is a 53-year-old gentleman who presents with a chief complaint of erectile dysfunction status post RALP (March 16, 2023 at [facility] [place]).. He states that this has been present for the past 2 years. It causes both he and his partner great stress.  I reviewed the questionnaire he completed in detail. His erections are not modified with the degree of sexual stimulation.   He states that his erections presently are often less than 7 out of 10 in both tumescence and rigidity.  He states that he is difficulty maintaining the erection and sometimes the erection does not occur at all..  He has difficulty maintaining an erection. He describes a normal libido.  His sexual dysfunction occurs with both sexual relations and masturbation.  His erections are not improved with PDE5 inhibitors.    His partner is understanding and was unable to be with him at the visit today. He is not  and has adult children.    The patient denies fevers, chills, nausea and/or vomiting and no unexplained weight loss.  His past medical history is non-contributory.  In his present occupation he has no known toxin exposure. He does not smoke and drinks socially.  He has no known drug allergies. His review of systems and past medical history demonstrates no significant urologic issues (see patient completed questionnaire). His family history demonstrates no significant urologic issues.

## 2023-08-10 NOTE — ASSESSMENT
[FreeTextEntry1] : The patient returns for follow up to review his blood studies and discuss options for therapy. Blood studies demonstrated a PSA of 0.05 ng/ml. I have asked him to dscuss this with Dr. Everett. Testosterone was 470 ng/dl. Vitamin D25 was 16 ng/ml and supplementation was discussed.  Penile duplex ultrasound demonstrated normal arteries and penile firmness bilaterally. He would like to continue with Tadalafil which gives him a n 8 to9/10 erection. I will see him back in 3 months in follow up.  Consultation: 30 minutes 10 minutes reviewing his history and discussing prior results. 20 minutes discussing various treatment options and writing his note. There was also additional time in preparing for the visit and assisting the patient with technology issues he was having with the telehealth platform.

## 2023-08-11 DIAGNOSIS — N52.01 ERECTILE DYSFUNCTION DUE TO ARTERIAL INSUFFICIENCY: ICD-10-CM

## 2023-11-21 NOTE — PROGRESS NOTES
Suture/ Staple removal  Date/Time: 7/30/2019 5:42 PM  Performed by: DONOVAN Ahn  Authorized by: DONOVAN Ahn     Consent:     Consent obtained:  Verbal    Consent given by:  Patient    Risks discussed:  Bleeding, pain and wound separation    Alternatives discussed:  Delayed treatment  Location:     Location:  Lower extremity    Lower extremity location:  Leg    Leg location:  R upper leg  Procedure details:     Wound appearance:  No signs of infection, good wound healing and clean    Number of sutures removed:  4  Post-procedure details:     Post-removal:  Band-Aid applied    Patient tolerance of procedure:  Tolerated well, no immediate complications         Detail Level: Zone Detail Level: Generalized Detail Level: Detailed

## 2024-06-20 ENCOUNTER — NON-APPOINTMENT (OUTPATIENT)
Age: 54
End: 2024-06-20

## 2024-07-16 ENCOUNTER — NON-APPOINTMENT (OUTPATIENT)
Age: 54
End: 2024-07-16

## 2024-07-16 ENCOUNTER — APPOINTMENT (OUTPATIENT)
Dept: CARDIOLOGY | Facility: CLINIC | Age: 54
End: 2024-07-16
Payer: COMMERCIAL

## 2024-07-16 VITALS
WEIGHT: 280 LBS | DIASTOLIC BLOOD PRESSURE: 80 MMHG | HEART RATE: 62 BPM | OXYGEN SATURATION: 97 % | SYSTOLIC BLOOD PRESSURE: 132 MMHG | BODY MASS INDEX: 36.94 KG/M2

## 2024-07-16 DIAGNOSIS — I10 ESSENTIAL (PRIMARY) HYPERTENSION: ICD-10-CM

## 2024-07-16 DIAGNOSIS — E78.5 HYPERLIPIDEMIA, UNSPECIFIED: ICD-10-CM

## 2024-07-16 DIAGNOSIS — E66.9 OBESITY, UNSPECIFIED: ICD-10-CM

## 2024-07-16 PROCEDURE — 99204 OFFICE O/P NEW MOD 45 MIN: CPT | Mod: 25

## 2024-07-16 PROCEDURE — 93000 ELECTROCARDIOGRAM COMPLETE: CPT

## 2024-07-16 RX ORDER — VALSARTAN 160 MG/1
160 TABLET, COATED ORAL DAILY
Qty: 90 | Refills: 3 | Status: ACTIVE | COMMUNITY

## 2024-07-16 RX ORDER — TADALAFIL 10 MG/1
10 TABLET, FILM COATED ORAL
Refills: 0 | Status: ACTIVE | COMMUNITY

## 2024-07-22 LAB
ALBUMIN SERPL ELPH-MCNC: 4.5 G/DL
ALP BLD-CCNC: 71 U/L
ALT SERPL-CCNC: 33 U/L
ANION GAP SERPL CALC-SCNC: 12 MMOL/L
AST SERPL-CCNC: 23 U/L
BILIRUB SERPL-MCNC: 0.4 MG/DL
BUN SERPL-MCNC: 23 MG/DL
CALCIUM SERPL-MCNC: 9.4 MG/DL
CHLORIDE SERPL-SCNC: 102 MMOL/L
CHOLEST SERPL-MCNC: 245 MG/DL
CO2 SERPL-SCNC: 25 MMOL/L
CREAT SERPL-MCNC: 0.75 MG/DL
EGFR: 107 ML/MIN/1.73M2
ESTIMATED AVERAGE GLUCOSE: 108 MG/DL
GLUCOSE SERPL-MCNC: 85 MG/DL
HBA1C MFR BLD HPLC: 5.4 %
HDLC SERPL-MCNC: 60 MG/DL
LDLC SERPL CALC-MCNC: 175 MG/DL
NONHDLC SERPL-MCNC: 185 MG/DL
POTASSIUM SERPL-SCNC: 4.9 MMOL/L
PROT SERPL-MCNC: 7 G/DL
SODIUM SERPL-SCNC: 138 MMOL/L
TRIGL SERPL-MCNC: 65 MG/DL

## 2024-10-17 ENCOUNTER — LABORATORY RESULT (OUTPATIENT)
Age: 54
End: 2024-10-17

## 2024-10-29 ENCOUNTER — APPOINTMENT (OUTPATIENT)
Dept: CARDIOLOGY | Facility: CLINIC | Age: 54
End: 2024-10-29
Payer: COMMERCIAL

## 2024-10-29 PROCEDURE — 93015 CV STRESS TEST SUPVJ I&R: CPT
